# Patient Record
Sex: FEMALE | Race: WHITE | URBAN - METROPOLITAN AREA
[De-identification: names, ages, dates, MRNs, and addresses within clinical notes are randomized per-mention and may not be internally consistent; named-entity substitution may affect disease eponyms.]

---

## 2017-06-28 LAB
AVERAGE GLUCOSE: 189
HBA1C MFR BLD: 8.2 %
MAGNESIUM: 1.4 MG/DL

## 2017-09-27 LAB
ALBUMIN SERPL-MCNC: 3.9 G/DL
ALP BLD-CCNC: 80 U/L
ALT SERPL-CCNC: 16 U/L
ANION GAP SERPL CALCULATED.3IONS-SCNC: 10 MMOL/L
AST SERPL-CCNC: 11 U/L
BILIRUB SERPL-MCNC: 0.5 MG/DL (ref 0.1–1.4)
BUN BLDV-MCNC: 18 MG/DL
CALCIUM SERPL-MCNC: 9.3 MG/DL
CHLORIDE BLD-SCNC: 109 MMOL/L
CO2: 26 MMOL/L
CREAT SERPL-MCNC: 1.24 MG/DL
GFR CALCULATED: 41
GLUCOSE BLD-MCNC: 138 MG/DL
POTASSIUM SERPL-SCNC: 4 MMOL/L
SODIUM BLD-SCNC: 141 MMOL/L
TOTAL PROTEIN: 7.5

## 2017-12-28 LAB
AVERAGE GLUCOSE: 163
BASOPHILS ABSOLUTE: NORMAL /ΜL
BASOPHILS RELATIVE PERCENT: NORMAL %
EOSINOPHILS ABSOLUTE: NORMAL /ΜL
EOSINOPHILS RELATIVE PERCENT: NORMAL %
HBA1C MFR BLD: 7.3 %
HCT VFR BLD CALC: 42.3 % (ref 36–46)
HEMOGLOBIN: 14.9 G/DL (ref 12–16)
LYMPHOCYTES ABSOLUTE: NORMAL /ΜL
LYMPHOCYTES RELATIVE PERCENT: NORMAL %
MAGNESIUM: 1.6 MG/DL
MCH RBC QN AUTO: NORMAL PG
MCHC RBC AUTO-ENTMCNC: NORMAL G/DL
MCV RBC AUTO: NORMAL FL
MONOCYTES ABSOLUTE: NORMAL /ΜL
MONOCYTES RELATIVE PERCENT: NORMAL %
NEUTROPHILS ABSOLUTE: NORMAL /ΜL
NEUTROPHILS RELATIVE PERCENT: NORMAL %
PLATELET # BLD: 207 K/ΜL
PMV BLD AUTO: NORMAL FL
RBC # BLD: 4.53 10^6/ΜL
WBC # BLD: 6.1 10^3/ML

## 2018-04-04 LAB
ALBUMIN SERPL-MCNC: 3.7 G/DL
ALP BLD-CCNC: 83 U/L
ALT SERPL-CCNC: 15 U/L
ANION GAP SERPL CALCULATED.3IONS-SCNC: 10 MMOL/L
AST SERPL-CCNC: 13 U/L
AVERAGE GLUCOSE: 180
BASOPHILS ABSOLUTE: NORMAL /ΜL
BASOPHILS RELATIVE PERCENT: NORMAL %
BILIRUB SERPL-MCNC: 0.4 MG/DL (ref 0.1–1.4)
BUN BLDV-MCNC: 20 MG/DL
CALCIUM SERPL-MCNC: 8.9 MG/DL
CHLORIDE BLD-SCNC: 109 MMOL/L
CHOLESTEROL, TOTAL: 153 MG/DL
CHOLESTEROL/HDL RATIO: NORMAL
CO2: 26 MMOL/L
CREAT SERPL-MCNC: 1.22 MG/DL
EOSINOPHILS ABSOLUTE: NORMAL /ΜL
EOSINOPHILS RELATIVE PERCENT: NORMAL %
GFR CALCULATED: 42
GLUCOSE BLD-MCNC: 165 MG/DL
HBA1C MFR BLD: 7.9 %
HCT VFR BLD CALC: 36.1 % (ref 36–46)
HDLC SERPL-MCNC: 58 MG/DL (ref 35–70)
HEMOGLOBIN: 12.2 G/DL (ref 12–16)
LDL CHOLESTEROL CALCULATED: 58 MG/DL (ref 0–160)
LYMPHOCYTES ABSOLUTE: NORMAL /ΜL
LYMPHOCYTES RELATIVE PERCENT: NORMAL %
MCH RBC QN AUTO: NORMAL PG
MCHC RBC AUTO-ENTMCNC: NORMAL G/DL
MCV RBC AUTO: NORMAL FL
MONOCYTES ABSOLUTE: NORMAL /ΜL
MONOCYTES RELATIVE PERCENT: NORMAL %
NEUTROPHILS ABSOLUTE: NORMAL /ΜL
NEUTROPHILS RELATIVE PERCENT: NORMAL %
PLATELET # BLD: 176 K/ΜL
PMV BLD AUTO: NORMAL FL
POTASSIUM SERPL-SCNC: 4.2 MMOL/L
RBC # BLD: 3.8 10^6/ΜL
SODIUM BLD-SCNC: 141 MMOL/L
TOTAL PROTEIN: 7
TRIGL SERPL-MCNC: 187 MG/DL
VLDLC SERPL CALC-MCNC: 95 MG/DL
WBC # BLD: 4.66 10^3/ML

## 2018-09-27 LAB
BASOPHILS ABSOLUTE: NORMAL /ΜL
BASOPHILS RELATIVE PERCENT: NORMAL %
EOSINOPHILS ABSOLUTE: NORMAL /ΜL
EOSINOPHILS RELATIVE PERCENT: NORMAL %
HCT VFR BLD CALC: 38.5 % (ref 36–46)
HEMOGLOBIN: 13.5 G/DL (ref 12–16)
LYMPHOCYTES ABSOLUTE: NORMAL /ΜL
LYMPHOCYTES RELATIVE PERCENT: NORMAL %
MCH RBC QN AUTO: NORMAL PG
MCHC RBC AUTO-ENTMCNC: NORMAL G/DL
MCV RBC AUTO: NORMAL FL
MONOCYTES ABSOLUTE: NORMAL /ΜL
MONOCYTES RELATIVE PERCENT: NORMAL %
NEUTROPHILS ABSOLUTE: NORMAL /ΜL
NEUTROPHILS RELATIVE PERCENT: NORMAL %
PLATELET # BLD: 179 K/ΜL
PMV BLD AUTO: NORMAL FL
RBC # BLD: 4.19 10^6/ΜL
WBC # BLD: 4.27 10^3/ML

## 2018-10-09 LAB
ALBUMIN SERPL-MCNC: 3.7 G/DL
ALP BLD-CCNC: 73 U/L
ALT SERPL-CCNC: 15 U/L
ANION GAP SERPL CALCULATED.3IONS-SCNC: 10 MMOL/L
AST SERPL-CCNC: 13 U/L
AVERAGE GLUCOSE: 146
BASOPHILS ABSOLUTE: NORMAL /ΜL
BASOPHILS RELATIVE PERCENT: NORMAL %
BILIRUB SERPL-MCNC: 0.5 MG/DL (ref 0.1–1.4)
BUN BLDV-MCNC: 18 MG/DL
CALCIUM SERPL-MCNC: 8.9 MG/DL
CHLORIDE BLD-SCNC: 115 MMOL/L
CO2: 25 MMOL/L
CREAT SERPL-MCNC: 1.45 MG/DL
EOSINOPHILS ABSOLUTE: NORMAL /ΜL
EOSINOPHILS RELATIVE PERCENT: NORMAL %
GFR CALCULATED: 34
GLUCOSE BLD-MCNC: 135 MG/DL
HBA1C MFR BLD: 6.7 %
HCT VFR BLD CALC: 38.7 % (ref 36–46)
HEMOGLOBIN: 12.9 G/DL (ref 12–16)
LYMPHOCYTES ABSOLUTE: NORMAL /ΜL
LYMPHOCYTES RELATIVE PERCENT: NORMAL %
MCH RBC QN AUTO: NORMAL PG
MCHC RBC AUTO-ENTMCNC: NORMAL G/DL
MCV RBC AUTO: NORMAL FL
MONOCYTES ABSOLUTE: NORMAL /ΜL
MONOCYTES RELATIVE PERCENT: NORMAL %
NEUTROPHILS ABSOLUTE: NORMAL /ΜL
NEUTROPHILS RELATIVE PERCENT: NORMAL %
PLATELET # BLD: 199 K/ΜL
PMV BLD AUTO: NORMAL FL
POTASSIUM SERPL-SCNC: 4.9 MMOL/L
RBC # BLD: 4.07 10^6/ΜL
SODIUM BLD-SCNC: 145 MMOL/L
TOTAL PROTEIN: 6.9
WBC # BLD: 4.82 10^3/ML

## 2018-11-26 ENCOUNTER — OFFICE VISIT (OUTPATIENT)
Dept: NEPHROLOGY | Age: 83
End: 2018-11-26
Payer: MEDICARE

## 2018-11-26 VITALS
DIASTOLIC BLOOD PRESSURE: 77 MMHG | SYSTOLIC BLOOD PRESSURE: 162 MMHG | HEART RATE: 74 BPM | WEIGHT: 146.6 LBS | OXYGEN SATURATION: 98 %

## 2018-11-26 DIAGNOSIS — I10 ESSENTIAL HYPERTENSION: ICD-10-CM

## 2018-11-26 DIAGNOSIS — N18.30 CKD (CHRONIC KIDNEY DISEASE) STAGE 3, GFR 30-59 ML/MIN (HCC): ICD-10-CM

## 2018-11-26 DIAGNOSIS — R80.9 MICROALBUMINURIA: ICD-10-CM

## 2018-11-26 PROCEDURE — 1123F ACP DISCUSS/DSCN MKR DOCD: CPT | Performed by: INTERNAL MEDICINE

## 2018-11-26 PROCEDURE — 1090F PRES/ABSN URINE INCON ASSESS: CPT | Performed by: INTERNAL MEDICINE

## 2018-11-26 PROCEDURE — G8400 PT W/DXA NO RESULTS DOC: HCPCS | Performed by: INTERNAL MEDICINE

## 2018-11-26 PROCEDURE — 1036F TOBACCO NON-USER: CPT | Performed by: INTERNAL MEDICINE

## 2018-11-26 PROCEDURE — 4040F PNEUMOC VAC/ADMIN/RCVD: CPT | Performed by: INTERNAL MEDICINE

## 2018-11-26 PROCEDURE — G8598 ASA/ANTIPLAT THER USED: HCPCS | Performed by: INTERNAL MEDICINE

## 2018-11-26 PROCEDURE — G8421 BMI NOT CALCULATED: HCPCS | Performed by: INTERNAL MEDICINE

## 2018-11-26 PROCEDURE — G8484 FLU IMMUNIZE NO ADMIN: HCPCS | Performed by: INTERNAL MEDICINE

## 2018-11-26 PROCEDURE — G8427 DOCREV CUR MEDS BY ELIG CLIN: HCPCS | Performed by: INTERNAL MEDICINE

## 2018-11-26 PROCEDURE — 1101F PT FALLS ASSESS-DOCD LE1/YR: CPT | Performed by: INTERNAL MEDICINE

## 2018-11-26 PROCEDURE — 99204 OFFICE O/P NEW MOD 45 MIN: CPT | Performed by: INTERNAL MEDICINE

## 2018-11-26 RX ORDER — ATORVASTATIN CALCIUM 40 MG/1
40 TABLET, FILM COATED ORAL DAILY
COMMUNITY

## 2018-11-26 RX ORDER — LISINOPRIL 2.5 MG/1
2.5 TABLET ORAL 2 TIMES DAILY
COMMUNITY

## 2018-11-26 RX ORDER — CLOPIDOGREL BISULFATE 75 MG/1
75 TABLET ORAL DAILY
COMMUNITY

## 2018-11-26 RX ORDER — MAGNESIUM OXIDE 400 MG/1
400 TABLET ORAL 2 TIMES DAILY
COMMUNITY

## 2018-11-26 RX ORDER — MECLIZINE HCL 12.5 MG/1
TABLET ORAL PRN
COMMUNITY
Start: 2017-04-24

## 2018-11-26 ASSESSMENT — ENCOUNTER SYMPTOMS
NAUSEA: 0
CHEST TIGHTNESS: 0
SHORTNESS OF BREATH: 0
EYES NEGATIVE: 1
COUGH: 0
BACK PAIN: 0
VOMITING: 0

## 2018-11-26 NOTE — PROGRESS NOTES
narrative on file     Family History   Problem Relation Age of Onset    Diabetes Mother     Heart Attack Father     Heart Attack Sister     Heart Attack Brother     Heart Attack Brother     Heart Attack Brother     Diabetes Brother     Heart Disease Brother     Other Brother         Motorcycle accident at age 21    Diabetes Sister     Other Sister         Murdered in [facility] 58 Other Sister         Murdered in 2014    Diabetes Sister      Medications & Allergies :      Prior to Admission medications    Medication Sig Start Date End Date Taking? Authorizing Provider   metFORMIN (GLUCOPHAGE) 1000 MG tablet Take 1,000 mg by mouth 2 times daily   Yes Historical Provider, MD   clopidogrel (PLAVIX) 75 MG tablet Take 75 mg by mouth daily   Yes Historical Provider, MD   lisinopril (PRINIVIL;ZESTRIL) 2.5 MG tablet Take 2.5 mg by mouth 2 times daily   Yes Historical Provider, MD   metoprolol tartrate (LOPRESSOR) 25 MG tablet Take 12.5 mg by mouth 2 times daily   Yes Historical Provider, MD   atorvastatin (LIPITOR) 40 MG tablet Take 40 mg by mouth daily   Yes Historical Provider, MD   aspirin 81 MG tablet Take 81 mg by mouth daily   Yes Historical Provider, MD   meclizine (ANTIVERT) 12.5 MG tablet Take by mouth as needed 4/24/17  Yes Historical Provider, MD   magnesium oxide (MAG-OX) 400 MG tablet Take 400 mg by mouth 2 times daily   Yes Historical Provider, MD     Allergies: Sulfa antibiotics and Shellfish-derived products    Review of Systems Physical Exam   Review of Systems   Constitutional: Positive for fatigue. Negative for chills and fever. HENT: Positive for hearing loss. Eyes: Negative. Respiratory: Negative for cough, chest tightness and shortness of breath. Cardiovascular: Negative for chest pain and leg swelling. Gastrointestinal: Negative for nausea and vomiting. Genitourinary: Negative for flank pain, frequency and hematuria.    Musculoskeletal: Negative for back pain, joint swelling and regurgitation    Assessment    1. Renal - patient seems to be having chronic kidney disease stage III most likely secondary to senile nephrosclerosis long-standing hypertension/diabetes  - Cannot rule out mild acute kidney injury at this time. This may be even progressive CKD  - At this time we will repeat her blood work check urine for protein, urinalysis and also obtain a renal ultrasound scan. 2. Essential hypertension running high, patient is having life stressors at this time advised home blood pressure monitoring  3. Electrolytes-appear to be within normal limits  4. Microalbuminuria we'll quantify mostly from diabetes on low-dose lisinopril  5. History of systolic congestive heart failure with ejection fraction 25% in 3/2015  6. Diabetes mellitus with last A1c of 6.7 in October 2018  7. FU in 6 weeks  Plan     Orders Placed This Encounter   Procedures    US Renal Kidney    Basic Metabolic Panel    Urinalysis with Microscopic    Microalbumin / Creatinine Urine Ratio    Protein / creatinine ratio, urine    Uric Acid       Pinky Tobin M.D  [facility].

## 2019-01-08 LAB — URIC ACID: 7.5

## 2019-01-09 ENCOUNTER — TELEPHONE (OUTPATIENT)
Dept: NEPHROLOGY | Age: 84
End: 2019-01-09

## 2019-01-09 DIAGNOSIS — R80.9 MICROALBUMINURIA: ICD-10-CM

## 2019-01-09 DIAGNOSIS — I10 ESSENTIAL HYPERTENSION: ICD-10-CM

## 2019-01-09 DIAGNOSIS — N18.30 CKD (CHRONIC KIDNEY DISEASE) STAGE 3, GFR 30-59 ML/MIN (HCC): ICD-10-CM

## 2019-01-14 ENCOUNTER — OFFICE VISIT (OUTPATIENT)
Dept: NEPHROLOGY | Age: 84
End: 2019-01-14
Payer: MEDICARE

## 2019-01-14 VITALS
WEIGHT: 143.5 LBS | DIASTOLIC BLOOD PRESSURE: 86 MMHG | SYSTOLIC BLOOD PRESSURE: 170 MMHG | HEART RATE: 80 BPM | OXYGEN SATURATION: 97 %

## 2019-01-14 DIAGNOSIS — E79.0 HYPERURICEMIA: ICD-10-CM

## 2019-01-14 DIAGNOSIS — N18.30 CKD (CHRONIC KIDNEY DISEASE) STAGE 3, GFR 30-59 ML/MIN (HCC): Primary | ICD-10-CM

## 2019-01-14 DIAGNOSIS — I10 ESSENTIAL HYPERTENSION: ICD-10-CM

## 2019-01-14 LAB
BUN BLDV-MCNC: 20 MG/DL
CALCIUM SERPL-MCNC: 9 MG/DL
CHLORIDE BLD-SCNC: 112 MMOL/L
CO2: 22 MMOL/L
CREAT SERPL-MCNC: 1.3 MG/DL
GFR CALCULATED: 37
GLUCOSE BLD-MCNC: 114 MG/DL
POTASSIUM SERPL-SCNC: 4.9 MMOL/L
SODIUM BLD-SCNC: 144 MMOL/L

## 2019-01-14 PROCEDURE — G8484 FLU IMMUNIZE NO ADMIN: HCPCS | Performed by: INTERNAL MEDICINE

## 2019-01-14 PROCEDURE — 1123F ACP DISCUSS/DSCN MKR DOCD: CPT | Performed by: INTERNAL MEDICINE

## 2019-01-14 PROCEDURE — G8427 DOCREV CUR MEDS BY ELIG CLIN: HCPCS | Performed by: INTERNAL MEDICINE

## 2019-01-14 PROCEDURE — 1036F TOBACCO NON-USER: CPT | Performed by: INTERNAL MEDICINE

## 2019-01-14 PROCEDURE — G8421 BMI NOT CALCULATED: HCPCS | Performed by: INTERNAL MEDICINE

## 2019-01-14 PROCEDURE — 4040F PNEUMOC VAC/ADMIN/RCVD: CPT | Performed by: INTERNAL MEDICINE

## 2019-01-14 PROCEDURE — 99213 OFFICE O/P EST LOW 20 MIN: CPT | Performed by: INTERNAL MEDICINE

## 2019-01-14 PROCEDURE — 1101F PT FALLS ASSESS-DOCD LE1/YR: CPT | Performed by: INTERNAL MEDICINE

## 2019-01-14 PROCEDURE — 1090F PRES/ABSN URINE INCON ASSESS: CPT | Performed by: INTERNAL MEDICINE

## 2019-01-14 PROCEDURE — G8400 PT W/DXA NO RESULTS DOC: HCPCS | Performed by: INTERNAL MEDICINE

## 2019-01-14 PROCEDURE — G8598 ASA/ANTIPLAT THER USED: HCPCS | Performed by: INTERNAL MEDICINE

## 2019-01-15 LAB
BILIRUBIN, URINE: NEGATIVE
BLOOD, URINE: POSITIVE
CLARITY: ABNORMAL
COLOR: ABNORMAL
CREATININE, URINE: 175
GLUCOSE URINE: ABNORMAL
KETONES, URINE: NEGATIVE
LEUKOCYTE ESTERASE, URINE: POSITIVE
MICROALBUMIN/CREAT 24H UR: 3.4 MG/G{CREAT}
MICROALBUMIN/CREAT UR-RTO: 19
NITRITE, URINE: POSITIVE
PH UA: 5 (ref 4.5–8)
PROTEIN UA: NEGATIVE
SPECIFIC GRAVITY, URINE: 1.02
UROBILINOGEN, URINE: NORMAL

## 2019-07-08 ENCOUNTER — OFFICE VISIT (OUTPATIENT)
Dept: NEPHROLOGY | Age: 84
End: 2019-07-08
Payer: MEDICARE

## 2019-07-08 VITALS
WEIGHT: 145.1 LBS | DIASTOLIC BLOOD PRESSURE: 70 MMHG | OXYGEN SATURATION: 97 % | SYSTOLIC BLOOD PRESSURE: 120 MMHG | HEART RATE: 70 BPM

## 2019-07-08 DIAGNOSIS — R80.9 MICROALBUMINURIA: ICD-10-CM

## 2019-07-08 DIAGNOSIS — I10 ESSENTIAL HYPERTENSION: ICD-10-CM

## 2019-07-08 DIAGNOSIS — E79.0 HYPERURICEMIA: ICD-10-CM

## 2019-07-08 DIAGNOSIS — N18.30 CKD (CHRONIC KIDNEY DISEASE) STAGE 3, GFR 30-59 ML/MIN (HCC): Primary | ICD-10-CM

## 2019-07-08 PROCEDURE — 1036F TOBACCO NON-USER: CPT | Performed by: INTERNAL MEDICINE

## 2019-07-08 PROCEDURE — 1090F PRES/ABSN URINE INCON ASSESS: CPT | Performed by: INTERNAL MEDICINE

## 2019-07-08 PROCEDURE — 4040F PNEUMOC VAC/ADMIN/RCVD: CPT | Performed by: INTERNAL MEDICINE

## 2019-07-08 PROCEDURE — G8421 BMI NOT CALCULATED: HCPCS | Performed by: INTERNAL MEDICINE

## 2019-07-08 PROCEDURE — G8598 ASA/ANTIPLAT THER USED: HCPCS | Performed by: INTERNAL MEDICINE

## 2019-07-08 PROCEDURE — G8427 DOCREV CUR MEDS BY ELIG CLIN: HCPCS | Performed by: INTERNAL MEDICINE

## 2019-07-08 PROCEDURE — G8400 PT W/DXA NO RESULTS DOC: HCPCS | Performed by: INTERNAL MEDICINE

## 2019-07-08 PROCEDURE — 1123F ACP DISCUSS/DSCN MKR DOCD: CPT | Performed by: INTERNAL MEDICINE

## 2019-07-08 PROCEDURE — 99213 OFFICE O/P EST LOW 20 MIN: CPT | Performed by: INTERNAL MEDICINE

## 2019-08-05 LAB
BUN BLDV-MCNC: 22 MG/DL
CALCIUM SERPL-MCNC: 9.1 MG/DL
CHLORIDE BLD-SCNC: 110 MMOL/L
CO2: 26 MMOL/L
CREAT SERPL-MCNC: 1.33 MG/DL
GFR CALCULATED: 37
GLUCOSE BLD-MCNC: 144 MG/DL
POTASSIUM SERPL-SCNC: 3.9 MMOL/L
SODIUM BLD-SCNC: 142 MMOL/L

## 2020-07-13 ENCOUNTER — OFFICE VISIT (OUTPATIENT)
Dept: NEPHROLOGY | Age: 85
End: 2020-07-13
Payer: MEDICARE

## 2020-07-13 VITALS
DIASTOLIC BLOOD PRESSURE: 76 MMHG | WEIGHT: 143 LBS | OXYGEN SATURATION: 97 % | SYSTOLIC BLOOD PRESSURE: 128 MMHG | HEART RATE: 66 BPM

## 2020-07-13 PROCEDURE — 4040F PNEUMOC VAC/ADMIN/RCVD: CPT | Performed by: INTERNAL MEDICINE

## 2020-07-13 PROCEDURE — G8400 PT W/DXA NO RESULTS DOC: HCPCS | Performed by: INTERNAL MEDICINE

## 2020-07-13 PROCEDURE — 1036F TOBACCO NON-USER: CPT | Performed by: INTERNAL MEDICINE

## 2020-07-13 PROCEDURE — 99213 OFFICE O/P EST LOW 20 MIN: CPT | Performed by: INTERNAL MEDICINE

## 2020-07-13 PROCEDURE — 1090F PRES/ABSN URINE INCON ASSESS: CPT | Performed by: INTERNAL MEDICINE

## 2020-07-13 PROCEDURE — G8421 BMI NOT CALCULATED: HCPCS | Performed by: INTERNAL MEDICINE

## 2020-07-13 PROCEDURE — 1123F ACP DISCUSS/DSCN MKR DOCD: CPT | Performed by: INTERNAL MEDICINE

## 2020-07-13 PROCEDURE — G8427 DOCREV CUR MEDS BY ELIG CLIN: HCPCS | Performed by: INTERNAL MEDICINE

## 2020-07-13 NOTE — PROGRESS NOTES
[facility]        Outpatient Follow-Up note         7/13/2020 1:32 PM    Patient Name:   Jacey Paiz  YOB: 1935  Primary Care Physician:  Annetta Avila     Chief Complaint / Reason for follow-up : Follow Up of CKD     Interval History :  Patient seen and examined by me. Feels well. No chest pain or SOB. No acute distress. No hospitalizations .  No med changes     Past History :  Past Medical History:   Diagnosis Date    [facility] [address] [address]     Heart attack ([facility] [address] 03/2015    Hypertension     Vertigo      Past Surgical History:   Procedure Laterality Date    BREAST SURGERY Right 01/1955    CHOLECYSTECTOMY      1980    JOINT REPLACEMENT  03/2015        Medications :     Outpatient Medications Marked as Taking for the 7/13/20 encounter (Office Visit) with Marian Daniel MD   Medication Sig Dispense Refill    metFORMIN (GLUCOPHAGE) 1000 MG tablet Take 1,000 mg by mouth 2 times daily      clopidogrel (PLAVIX) 75 MG tablet Take 75 mg by mouth daily      lisinopril (PRINIVIL;ZESTRIL) 2.5 MG tablet Take 2.5 mg by mouth 2 times daily      metoprolol tartrate (LOPRESSOR) 25 MG tablet Take 12.5 mg by mouth 2 times daily      atorvastatin (LIPITOR) 40 MG tablet Take 40 mg by mouth daily      aspirin 81 MG tablet Take 81 mg by mouth daily      meclizine (ANTIVERT) 12.5 MG tablet Take by mouth as needed      magnesium oxide (MAG-OX) 400 MG tablet Take 400 mg by mouth 2 times daily         Vitals     /76 (Site: Left Upper Arm, Position: Sitting, Cuff Size: Medium Adult)   Pulse 66   Wt 143 lb (64.9 kg) Comment: per patient within last month  SpO2 97%  Wt Readings from Last 3 Encounters:   07/13/20 143 lb (64.9 kg)   07/08/19 145 lb 1.6 oz (65.8 kg)   01/14/19 143 lb 8 oz (65.1 kg)        Physical Exam     General -- no distress  Lungs -- clear  Heart -- S1, S2 heard, JVD - no  Abdomen - soft, non-tender  Extremities -- no edema  CNS - awake and alert    Labs, Radiology and Tests    Labs -     11/18 4/19 5/20                Potassium 4.9  3.8 3.9                BUN 18  18 24                Creatinine 1.45  1.28  1.86               eGFR 34  39  28                                     UPCR 30 100                  UMCR    19 9                                        1/19 - uric acid - 7.5    Renal Ultrasound Scan -- 1/2019  Rt kidney 10 cm/ Left kidney 10 cm  Normal study      Other : old  lab data and PCP notes have been reviewed and noted patient's baseline creatinine is normally around 1.2 in 2017     Echocardiogram 3/2015 shows ejection fraction 20-25% and mild mitral regurgitation     Assessment    1. Renal - patient seems to be having chronic kidney disease stage III most likely secondary to senile nephrosclerosis long-standing hypertension/diabetes  -Patient's creatinine is slightly worse has been getting worse within the last 6 months.  -Not exactly clear whether this is some acute kidney injury or progressive chronic kidney disease  -Patient is drinking a lot of liquids. Will discontinue the lisinopril and repeat blood work on Thursday  2. Essential hypertension running well. Continue current medications. Discontinue lisinopril  3. Electrolytes-appear to be within normal limits  4. Microalbuminuria-minimal hold lisinopril for now  5. History of systolic congestive heart failure with ejection fraction 25% in 3/2015 . Not on diuretics  6. Diabetes mellitus with last A1c of 7.3 in April 2019 she is on metformin. Prefer slightly lower dose but okay to continue for now  7. Hyperuricemia-repeat in the next visit  8. Hypomagnesemia -on magnesium supplements. Will check a magnesium level today unclear etiology  9. FU in 3 months      Tests and orders placed this Encounter     Orders Placed This Encounter   Procedures    Basic Metabolic Panel    Basic Metabolic Panel    CBC       Sue Cates M.D  [facility].

## 2020-07-16 LAB
BUN BLDV-MCNC: 14 MG/DL
CALCIUM SERPL-MCNC: 9 MG/DL
CHLORIDE BLD-SCNC: 108 MMOL/L
CO2: 26 MMOL/L
CREAT SERPL-MCNC: 1.22 MG/DL
GFR CALCULATED: 41
GLUCOSE BLD-MCNC: 159 MG/DL
POTASSIUM SERPL-SCNC: 3.9 MMOL/L
SODIUM BLD-SCNC: 140 MMOL/L

## 2020-10-12 ENCOUNTER — OFFICE VISIT (OUTPATIENT)
Dept: NEPHROLOGY | Age: 85
End: 2020-10-12
Payer: MEDICARE

## 2020-10-12 VITALS
WEIGHT: 141 LBS | DIASTOLIC BLOOD PRESSURE: 70 MMHG | HEART RATE: 65 BPM | SYSTOLIC BLOOD PRESSURE: 126 MMHG | OXYGEN SATURATION: 97 %

## 2020-10-12 PROCEDURE — G8400 PT W/DXA NO RESULTS DOC: HCPCS | Performed by: INTERNAL MEDICINE

## 2020-10-12 PROCEDURE — G8421 BMI NOT CALCULATED: HCPCS | Performed by: INTERNAL MEDICINE

## 2020-10-12 PROCEDURE — G8484 FLU IMMUNIZE NO ADMIN: HCPCS | Performed by: INTERNAL MEDICINE

## 2020-10-12 PROCEDURE — G8427 DOCREV CUR MEDS BY ELIG CLIN: HCPCS | Performed by: INTERNAL MEDICINE

## 2020-10-12 PROCEDURE — 99213 OFFICE O/P EST LOW 20 MIN: CPT | Performed by: INTERNAL MEDICINE

## 2020-10-12 PROCEDURE — 1090F PRES/ABSN URINE INCON ASSESS: CPT | Performed by: INTERNAL MEDICINE

## 2020-10-12 PROCEDURE — 4040F PNEUMOC VAC/ADMIN/RCVD: CPT | Performed by: INTERNAL MEDICINE

## 2020-10-12 PROCEDURE — 1123F ACP DISCUSS/DSCN MKR DOCD: CPT | Performed by: INTERNAL MEDICINE

## 2020-10-12 PROCEDURE — 1036F TOBACCO NON-USER: CPT | Performed by: INTERNAL MEDICINE

## 2020-10-12 NOTE — PROGRESS NOTES
Has a lot of leakage at night time     No current pharmacy.  Waiting to see which pharmacy her insurance will except

## 2020-10-12 NOTE — PROGRESS NOTES
[facility]        Outpatient Follow-Up note         10/12/2020 1:46 PM    Patient Name:   Edis Barrientos  YOB: 1935  Primary Care Physician:  Kylee Miguel     Chief Complaint / Reason for follow-up : Follow Up of CKD     Interval History :  Patient seen and examined by me. Feels well. No chest pain or SOB. No acute distress. No hospitalizations .  No med changes since last visit     Past History :  Past Medical History:   Diagnosis Date    Diabetes ([facility] [address]     Heart attack ([facility] [address] 03/2015    Hypertension     Vertigo      Past Surgical History:   Procedure Laterality Date    BREAST SURGERY Right 01/1955    CHOLECYSTECTOMY      1980    JOINT REPLACEMENT  03/2015        Medications :     Outpatient Medications Marked as Taking for the 10/12/20 encounter (Office Visit) with Dalila Burnham MD   Medication Sig Dispense Refill    metFORMIN (GLUCOPHAGE) 1000 MG tablet Take 1,000 mg by mouth 2 times daily      clopidogrel (PLAVIX) 75 MG tablet Take 75 mg by mouth daily      lisinopril (PRINIVIL;ZESTRIL) 2.5 MG tablet Take 2.5 mg by mouth 2 times daily      metoprolol tartrate (LOPRESSOR) 25 MG tablet Take 12.5 mg by mouth 2 times daily      atorvastatin (LIPITOR) 40 MG tablet Take 40 mg by mouth daily      aspirin 81 MG tablet Take 81 mg by mouth daily      meclizine (ANTIVERT) 12.5 MG tablet Take by mouth as needed      magnesium oxide (MAG-OX) 400 MG tablet Take 400 mg by mouth 2 times daily         Vitals     /70 (Site: Left Upper Arm, Position: Sitting, Cuff Size: Medium Adult)   Pulse 65   Wt 141 lb (64 kg)   SpO2 97%  Wt Readings from Last 3 Encounters:   10/12/20 141 lb (64 kg)   07/13/20 143 lb (64.9 kg)   07/08/19 145 lb 1.6 oz (65.8 kg)        Physical Exam     General -- no distress  Lungs -- clear  Heart -- S1, S2 heard, JVD - no  Abdomen - soft, non-tender  Extremities -- no edema  CNS - awake and alert    Labs, Radiology and Tests Labs -     11/18 4/19  5/20  10/20              Potassium 4.9  3.8 3.9   4.0             BUN 18  18 24   19             Creatinine 1.45  1.28  1.86  1.36             eGFR 34  39  28  36                                   UPCR 30 100                  UMCR    19 9                                          Renal Ultrasound Scan -- 1/2019  Rt kidney 10 cm/ Left kidney 10 cm  Normal study      Echocardiogram 3/2015 shows ejection fraction 20-25% and mild mitral regurgitation     Assessment    1. Renal - patient seems to be having chronic kidney disease stage III most likely secondary to senile nephrosclerosis long-standing hypertension/diabetes  -Patient's creatinine has actually worsened  - overall well. 2. Essential hypertension running well. Continue current medications. Discontinue lisinopril  3. Electrolytes-appear to be within normal limits  4. Microalbuminuria-minimal on lisinopril  5. History of systolic congestive heart failure with ejection fraction 25% in 3/2015 . Not on diuretics  6. Diabetes mellitus with last A1c of 7.4 in October 2020 she is on metformin. Prefer slightly lower dose but okay to continue for now  7. Hyperuricemia-repeat in the next visit  8. Hypomagnesemia -on magnesium supplements. Will check a magnesium level today unclear etiology  9. FU in 3 months      Tests and orders placed this Encounter     Orders Placed This Encounter   Procedures    Basic Metabolic Panel    Creatinine, Random Urine    MICROALBUMIN, RANDOM URINE (W/O CREATININE)    Protein, urine, random       Lazara Lennon M.D  [facility].

## 2021-04-08 LAB
BUN BLDV-MCNC: 27 MG/DL
CALCIUM SERPL-MCNC: 9.4 MG/DL
CHLORIDE BLD-SCNC: 109 MMOL/L
CO2: 25 MMOL/L
CREAT SERPL-MCNC: 1.43 MG/DL
CREATININE, URINE: 99.1
GFR CALCULATED: 34
GLUCOSE BLD-MCNC: 184 MG/DL
MICROALBUMIN/CREAT 24H UR: 0.2 MG/G{CREAT}
MICROALBUMIN/CREAT UR-RTO: 2
POTASSIUM SERPL-SCNC: 4.2 MMOL/L
PROTEIN, URINE, RANDOM: 10.6
SODIUM BLD-SCNC: 140 MMOL/L

## 2021-04-12 ENCOUNTER — OFFICE VISIT (OUTPATIENT)
Dept: NEPHROLOGY | Age: 86
End: 2021-04-12
Payer: MEDICARE

## 2021-04-12 VITALS
WEIGHT: 144 LBS | OXYGEN SATURATION: 98 % | SYSTOLIC BLOOD PRESSURE: 120 MMHG | HEART RATE: 63 BPM | DIASTOLIC BLOOD PRESSURE: 68 MMHG

## 2021-04-12 DIAGNOSIS — N18.32 STAGE 3B CHRONIC KIDNEY DISEASE (HCC): Primary | ICD-10-CM

## 2021-04-12 DIAGNOSIS — I10 ESSENTIAL HYPERTENSION: ICD-10-CM

## 2021-04-12 PROCEDURE — 1090F PRES/ABSN URINE INCON ASSESS: CPT | Performed by: INTERNAL MEDICINE

## 2021-04-12 PROCEDURE — G8427 DOCREV CUR MEDS BY ELIG CLIN: HCPCS | Performed by: INTERNAL MEDICINE

## 2021-04-12 PROCEDURE — 99213 OFFICE O/P EST LOW 20 MIN: CPT | Performed by: INTERNAL MEDICINE

## 2021-04-12 PROCEDURE — 1036F TOBACCO NON-USER: CPT | Performed by: INTERNAL MEDICINE

## 2021-04-12 PROCEDURE — G8400 PT W/DXA NO RESULTS DOC: HCPCS | Performed by: INTERNAL MEDICINE

## 2021-04-12 PROCEDURE — G8421 BMI NOT CALCULATED: HCPCS | Performed by: INTERNAL MEDICINE

## 2021-04-12 PROCEDURE — 1123F ACP DISCUSS/DSCN MKR DOCD: CPT | Performed by: INTERNAL MEDICINE

## 2021-04-12 PROCEDURE — 4040F PNEUMOC VAC/ADMIN/RCVD: CPT | Performed by: INTERNAL MEDICINE

## 2021-04-12 NOTE — PROGRESS NOTES
11/18 4/19  5/20  10/20   4/21           Potassium 4.9  3.8 3.9   4.0  4.2           BUN 18 18 24   19  27           Creatinine 1.45  1.28  1.86  1.36  1.43           eGFR 34  39  28  36  34                                 UPCR 30 100       100           UMCR    19 9     2                                     Renal Ultrasound Scan -- 1/2019  Rt kidney 10 cm/ Left kidney 10 cm  Normal study      Echocardiogram 3/2015 shows ejection fraction 20-25% and mild mitral regurgitation     Assessment    1. Renal - patient seems to be having chronic kidney disease stage III most likely secondary to senile nephrosclerosis long-standing hypertension/diabetes  -Patient's creatinine has been stable  2. Essential hypertension running well. Continue current medications. 3. Electrolytes-appear to be within normal limits  4. Microalbuminuria-minimal on lisinopril  5. History of systolic congestive heart failure with ejection fraction 25% in 3/2015 . Not on diuretics  6. Diabetes mellitus with last A1c of 7.4 in October 2020 she is on metformin. Prefer slightly lower dose but okay to continue for now  7. FU in 6 months      Tests and orders placed this Encounter     Orders Placed This Encounter   Procedures   Makeda Alfredo M.D  [facility].

## 2021-10-11 ENCOUNTER — OFFICE VISIT (OUTPATIENT)
Dept: NEPHROLOGY | Age: 86
End: 2021-10-11
Payer: MEDICARE

## 2021-10-11 VITALS — SYSTOLIC BLOOD PRESSURE: 122 MMHG | HEART RATE: 78 BPM | DIASTOLIC BLOOD PRESSURE: 64 MMHG | WEIGHT: 137 LBS

## 2021-10-11 DIAGNOSIS — I10 ESSENTIAL HYPERTENSION: ICD-10-CM

## 2021-10-11 DIAGNOSIS — N18.32 STAGE 3B CHRONIC KIDNEY DISEASE (HCC): Primary | ICD-10-CM

## 2021-10-11 PROCEDURE — G8484 FLU IMMUNIZE NO ADMIN: HCPCS | Performed by: INTERNAL MEDICINE

## 2021-10-11 PROCEDURE — 1090F PRES/ABSN URINE INCON ASSESS: CPT | Performed by: INTERNAL MEDICINE

## 2021-10-11 PROCEDURE — 1123F ACP DISCUSS/DSCN MKR DOCD: CPT | Performed by: INTERNAL MEDICINE

## 2021-10-11 PROCEDURE — G8400 PT W/DXA NO RESULTS DOC: HCPCS | Performed by: INTERNAL MEDICINE

## 2021-10-11 PROCEDURE — 1036F TOBACCO NON-USER: CPT | Performed by: INTERNAL MEDICINE

## 2021-10-11 PROCEDURE — G8427 DOCREV CUR MEDS BY ELIG CLIN: HCPCS | Performed by: INTERNAL MEDICINE

## 2021-10-11 PROCEDURE — G8421 BMI NOT CALCULATED: HCPCS | Performed by: INTERNAL MEDICINE

## 2021-10-11 PROCEDURE — 99213 OFFICE O/P EST LOW 20 MIN: CPT | Performed by: INTERNAL MEDICINE

## 2021-10-11 PROCEDURE — 4040F PNEUMOC VAC/ADMIN/RCVD: CPT | Performed by: INTERNAL MEDICINE

## 2021-10-11 RX ORDER — TIMOLOL MALEATE 5 MG/ML
SOLUTION/ DROPS OPHTHALMIC
COMMUNITY
Start: 2021-08-12 | End: 2022-04-11 | Stop reason: ALTCHOICE

## 2021-10-11 RX ORDER — BRIMONIDINE TARTRATE 2 MG/ML
SOLUTION/ DROPS OPHTHALMIC
COMMUNITY
Start: 2021-08-12 | End: 2022-04-11 | Stop reason: ALTCHOICE

## 2021-10-11 NOTE — PROGRESS NOTES
[facility]        Outpatient Follow-Up note         10/11/2021 2:04 PM    Patient Name:   Simon Oliva:    1935  Primary Care Physician:  Claudean Gasser     Chief Complaint / Reason for follow-up : Follow Up of CKD     Interval History :  Patient seen and examined by me. Feels well. No chest pain or SOB. No acute distress.   No hospitalizations or med changes since last visit     Past History :  Past Medical History:   Diagnosis Date    Diabetes ([facility] [address]     Heart attack ([place] [address] 03/2015    Hypertension     Vertigo      Past Surgical History:   Procedure Laterality Date    BREAST SURGERY Right 01/1955    CHOLECYSTECTOMY      1980    JOINT REPLACEMENT  03/2015        Medications :     Outpatient Medications Marked as Taking for the 10/11/21 encounter (Office Visit) with Heidi Tsai MD   Medication Sig Dispense Refill    brimonidine (ALPHAGAN) 0.2 % ophthalmic solution INSTILL ONE DROP INTO RIGHT EYE TWO TIMES DAILY UNTIL FURTHER NOTICE      timolol (TIMOPTIC) 0.5 % ophthalmic solution USE 1 DROP INTO BOTH EYES EVERY MORNING      metFORMIN (GLUCOPHAGE) 1000 MG tablet Take 1,000 mg by mouth 2 times daily      clopidogrel (PLAVIX) 75 MG tablet Take 75 mg by mouth daily      lisinopril (PRINIVIL;ZESTRIL) 2.5 MG tablet Take 2.5 mg by mouth 2 times daily      metoprolol tartrate (LOPRESSOR) 25 MG tablet Take 12.5 mg by mouth 2 times daily      atorvastatin (LIPITOR) 40 MG tablet Take 40 mg by mouth daily      aspirin 81 MG tablet Take 81 mg by mouth daily      meclizine (ANTIVERT) 12.5 MG tablet Take by mouth as needed      magnesium oxide (MAG-OX) 400 MG tablet Take 400 mg by mouth 2 times daily         Vitals     /64 (Site: Left Upper Arm, Position: Sitting, Cuff Size: Medium Adult)   Pulse 78   Wt 137 lb (62.1 kg)  Wt Readings from Last 3 Encounters:   10/11/21 137 lb (62.1 kg)   04/12/21 144 lb (65.3 kg)   10/12/20 141 lb (64 kg) Physical Exam     General -- no distress  Lungs -- clear  Heart -- S1, S2 heard, JVD - no  Abdomen - soft, non-tender  Extremities -- no edema  CNS - awake and alert    Labs, Radiology and Tests    Labs -     11/18 4/19  5/20  10/20   4/21  8/21         Potassium 4.9  3.8 3.9   4.0  4.2  4.4         BUN 18  18 24   19  27  19         Creatinine 1.45  1.28  1.86  1.36  1.43  1.28         eGFR 34  39  28  36  34  38                               UPCR 30 100       100           UMCR    19 9     2                                     Renal Ultrasound Scan -- 1/2019  Rt kidney 10 cm/ Left kidney 10 cm  Normal study      Echocardiogram 3/2015 shows ejection fraction 20-25% and mild mitral regurgitation     Assessment    1. Renal - patient seems to be having chronic kidney disease stage III most likely secondary to senile nephrosclerosis long-standing hypertension/diabetes  -Patient's creatinine has been stable . 2. Essential hypertension running well. Continue current medications. 3. Electrolytes-appear to be within normal limits  4. Microalbuminuria-minimal on lisinopril  5. History of systolic congestive heart failure with ejection fraction 25% in 3/2015 . Not on diuretics  6. Diabetes mellitus with last A1c of 7.0 in August 2021 she is on metformin. Prefer slightly lower dose but okay to continue for now  7. FU in 6 months      Tests and orders placed this Encounter     Orders Placed This Encounter   Procedures    Basic Metabolic Panel    Creatinine, Random Urine    MICROALBUMIN, RANDOM URINE (W/O CREATININE)    Protein, urine, random       Ashia Altman M.D  [facility].

## 2021-10-11 NOTE — PROGRESS NOTES
Last labs were in august. Ruchi Prows stated they were no current orders. She couldn't have her labs drawn until November 11th.

## 2022-04-04 LAB
BUN BLDV-MCNC: 12 MG/DL
CALCIUM SERPL-MCNC: 8.7 MG/DL
CHLORIDE BLD-SCNC: 112 MMOL/L
CO2: 27 MMOL/L
CREAT SERPL-MCNC: 1.14 MG/DL
GFR CALCULATED: 44
GLUCOSE BLD-MCNC: 141 MG/DL
POTASSIUM SERPL-SCNC: 3.9 MMOL/L
SODIUM BLD-SCNC: 142 MMOL/L

## 2022-04-11 ENCOUNTER — OFFICE VISIT (OUTPATIENT)
Dept: NEPHROLOGY | Age: 87
End: 2022-04-11
Payer: MEDICARE

## 2022-04-11 VITALS
WEIGHT: 140 LBS | SYSTOLIC BLOOD PRESSURE: 158 MMHG | HEIGHT: 63 IN | DIASTOLIC BLOOD PRESSURE: 86 MMHG | BODY MASS INDEX: 24.8 KG/M2

## 2022-04-11 DIAGNOSIS — N18.32 STAGE 3B CHRONIC KIDNEY DISEASE (HCC): Primary | ICD-10-CM

## 2022-04-11 DIAGNOSIS — I10 ESSENTIAL HYPERTENSION: ICD-10-CM

## 2022-04-11 PROCEDURE — 1090F PRES/ABSN URINE INCON ASSESS: CPT | Performed by: INTERNAL MEDICINE

## 2022-04-11 PROCEDURE — 99213 OFFICE O/P EST LOW 20 MIN: CPT | Performed by: INTERNAL MEDICINE

## 2022-04-11 PROCEDURE — 4040F PNEUMOC VAC/ADMIN/RCVD: CPT | Performed by: INTERNAL MEDICINE

## 2022-04-11 PROCEDURE — 1123F ACP DISCUSS/DSCN MKR DOCD: CPT | Performed by: INTERNAL MEDICINE

## 2022-04-11 PROCEDURE — G8420 CALC BMI NORM PARAMETERS: HCPCS | Performed by: INTERNAL MEDICINE

## 2022-04-11 PROCEDURE — G8400 PT W/DXA NO RESULTS DOC: HCPCS | Performed by: INTERNAL MEDICINE

## 2022-04-11 PROCEDURE — 1036F TOBACCO NON-USER: CPT | Performed by: INTERNAL MEDICINE

## 2022-04-11 PROCEDURE — G8427 DOCREV CUR MEDS BY ELIG CLIN: HCPCS | Performed by: INTERNAL MEDICINE

## 2022-04-11 NOTE — PROGRESS NOTES
[facility]        Outpatient Follow-Up note         4/11/2022 1:36 PM    Patient Name:   Luis Farrar  YOB: 1935  Primary Care Physician:  Michell Santana     Chief Complaint / Reason for follow-up : Follow Up of CKD     Interval History :  Patient seen and examined by me. Feels well. No chest pain or SOB. No acute distress. No hospitalizations or med changes.  Had eye surgery done     Past History :  Past Medical History:   Diagnosis Date    Diabetes ([facility] [address]     Heart attack ([facility] [address] 03/2015    Hypertension     Vertigo      Past Surgical History:   Procedure Laterality Date    BREAST SURGERY Right 01/1955    CHOLECYSTECTOMY      1980    JOINT REPLACEMENT  03/2015        Medications :     Outpatient Medications Marked as Taking for the 4/11/22 encounter (Office Visit) with Germaine Lizarraga MD   Medication Sig Dispense Refill    metFORMIN (GLUCOPHAGE) 1000 MG tablet Take 1,000 mg by mouth 2 times daily      clopidogrel (PLAVIX) 75 MG tablet Take 75 mg by mouth daily      lisinopril (PRINIVIL;ZESTRIL) 2.5 MG tablet Take 2.5 mg by mouth 2 times daily      metoprolol tartrate (LOPRESSOR) 25 MG tablet Take 12.5 mg by mouth 2 times daily      atorvastatin (LIPITOR) 40 MG tablet Take 40 mg by mouth daily      aspirin 81 MG tablet Take 81 mg by mouth daily      meclizine (ANTIVERT) 12.5 MG tablet Take by mouth as needed      magnesium oxide (MAG-OX) 400 MG tablet Take 400 mg by mouth 2 times daily         Vitals     BP (!) 158/86 (Site: Left Upper Arm, Position: Sitting, Cuff Size: Small Adult)   Ht 5' 3\" (1.6 m)   Wt 140 lb (63.5 kg)   BMI 24.80 kg/m²  Wt Readings from Last 3 Encounters:   04/11/22 140 lb (63.5 kg)   10/11/21 137 lb (62.1 kg)   04/12/21 144 lb (65.3 kg)        Physical Exam     General -- no distress  Lungs -- clear  Heart -- S1, S2 heard, JVD - no  Abdomen - soft, non-tender  Extremities -- no edema  CNS - awake and alert    Labs, Radiology and Tests    Labs -     11/18 4/19  5/20  10/20   4/21  8/21 4/22        Potassium 4.9  3.8 3.9   4.0  4.2  4.4  3.9       BUN 18  18 24   19  27  19  12       Creatinine 1.45  1.28  1.86  1.36  1.43  1.28  1.14       eGFR 34  39  28  36  34  38  44                             UPCR 30 100       100 100          UMCR    19 9     2  2                                   Renal Ultrasound Scan -- 1/2019  Rt kidney 10 cm/ Left kidney 10 cm  Normal study      Echocardiogram 3/2015 shows ejection fraction 20-25% and mild mitral regurgitation     Assessment    1. Renal - patient seems to be having chronic kidney disease stage III most likely secondary to senile nephrosclerosis long-standing hypertension/diabetes  -Patient's creatinine has been stable . infact better  2. Essential hypertension running well. Continue current medications. 3. Electrolytes-appear to be within normal limits  4. Microalbuminuria-minimal on lisinopril  5. History of systolic congestive heart failure with ejection fraction 25% in 3/2015 . 6. Hx of Diabetes mellitus  7. FU in 12 months      Tests and orders placed this Encounter     No orders of the defined types were placed in this encounter. Jorden Renee M.D  [facility].

## 2023-04-05 ENCOUNTER — OFFICE VISIT (OUTPATIENT)
Dept: NEPHROLOGY | Age: 88
End: 2023-04-05
Payer: MEDICARE

## 2023-04-05 VITALS — HEART RATE: 69 BPM | SYSTOLIC BLOOD PRESSURE: 134 MMHG | DIASTOLIC BLOOD PRESSURE: 80 MMHG | OXYGEN SATURATION: 96 %

## 2023-04-05 DIAGNOSIS — N18.32 STAGE 3B CHRONIC KIDNEY DISEASE (HCC): Primary | ICD-10-CM

## 2023-04-05 DIAGNOSIS — I10 ESSENTIAL HYPERTENSION: ICD-10-CM

## 2023-04-05 PROCEDURE — 99213 OFFICE O/P EST LOW 20 MIN: CPT | Performed by: INTERNAL MEDICINE

## 2023-04-05 PROCEDURE — G8420 CALC BMI NORM PARAMETERS: HCPCS | Performed by: INTERNAL MEDICINE

## 2023-04-05 PROCEDURE — G8427 DOCREV CUR MEDS BY ELIG CLIN: HCPCS | Performed by: INTERNAL MEDICINE

## 2023-04-05 PROCEDURE — 1123F ACP DISCUSS/DSCN MKR DOCD: CPT | Performed by: INTERNAL MEDICINE

## 2023-04-05 PROCEDURE — 1090F PRES/ABSN URINE INCON ASSESS: CPT | Performed by: INTERNAL MEDICINE

## 2023-04-05 PROCEDURE — 1036F TOBACCO NON-USER: CPT | Performed by: INTERNAL MEDICINE

## 2023-04-05 NOTE — PROGRESS NOTES
[facility]        Outpatient Follow-Up note         4/5/2023 10:30 AM    Patient Name:   Stephanie De La Rosa  YOB: 1935  Primary Care Physician:  Mavis Chen     Chief Complaint / Reason for follow-up : Follow Up of CKD     Interval History :  Patient seen and examined by me. Feels well. No chest pain or SOB. No acute distress. No hospitalizations or med changes.  Had eye surgery done     Past History :  Past Medical History:   Diagnosis Date    Diabetes ([facility] [address]     Heart attack ([facility] [address] 03/2015    Hypertension     Vertigo      Past Surgical History:   Procedure Laterality Date    BREAST SURGERY Right 01/1955    CHOLECYSTECTOMY      1980    JOINT REPLACEMENT  03/2015        Medications :     Outpatient Medications Marked as Taking for the 4/5/23 encounter (Office Visit) with Taz Dolan MD   Medication Sig Dispense Refill    metFORMIN (GLUCOPHAGE) 1000 MG tablet Take 1 tablet by mouth 2 times daily      clopidogrel (PLAVIX) 75 MG tablet Take 1 tablet by mouth daily      lisinopril (PRINIVIL;ZESTRIL) 2.5 MG tablet Take 1 tablet by mouth 2 times daily      metoprolol tartrate (LOPRESSOR) 25 MG tablet Take 0.5 tablets by mouth 2 times daily      atorvastatin (LIPITOR) 40 MG tablet Take 1 tablet by mouth daily      aspirin 81 MG tablet Take 1 tablet by mouth daily      meclizine (ANTIVERT) 12.5 MG tablet Take by mouth as needed      magnesium oxide (MAG-OX) 400 MG tablet Take 1 tablet by mouth 2 times daily         Vitals     /80 (Site: Left Upper Arm, Position: Sitting, Cuff Size: Medium Adult)   Pulse 69   SpO2 96%  Wt Readings from Last 3 Encounters:   04/11/22 140 lb (63.5 kg)   10/11/21 137 lb (62.1 kg)   04/12/21 144 lb (65.3 kg)        Physical Exam     General -- no distress  Lungs -- clear  Heart -- S1, S2 heard, JVD - no  Abdomen - soft, non-tender  Extremities -- no edema  CNS - awake and alert    Labs, Radiology and Tests    Labs -     11/18 4/19

## 2024-05-20 DIAGNOSIS — I10 ESSENTIAL HYPERTENSION: ICD-10-CM

## 2024-05-20 DIAGNOSIS — N18.32 STAGE 3B CHRONIC KIDNEY DISEASE (HCC): Primary | ICD-10-CM

## 2024-05-22 ENCOUNTER — OFFICE VISIT (OUTPATIENT)
Dept: NEPHROLOGY | Age: 89
End: 2024-05-22
Payer: MEDICARE

## 2024-05-22 VITALS
OXYGEN SATURATION: 100 % | HEART RATE: 75 BPM | DIASTOLIC BLOOD PRESSURE: 76 MMHG | BODY MASS INDEX: 23.74 KG/M2 | WEIGHT: 134 LBS | SYSTOLIC BLOOD PRESSURE: 124 MMHG

## 2024-05-22 DIAGNOSIS — I10 ESSENTIAL HYPERTENSION: Primary | ICD-10-CM

## 2024-05-22 DIAGNOSIS — N18.31 STAGE 3A CHRONIC KIDNEY DISEASE (HCC): ICD-10-CM

## 2024-05-22 PROCEDURE — 99213 OFFICE O/P EST LOW 20 MIN: CPT | Performed by: INTERNAL MEDICINE

## 2024-05-22 PROCEDURE — G8420 CALC BMI NORM PARAMETERS: HCPCS | Performed by: INTERNAL MEDICINE

## 2024-05-22 PROCEDURE — 1036F TOBACCO NON-USER: CPT | Performed by: INTERNAL MEDICINE

## 2024-05-22 PROCEDURE — G8427 DOCREV CUR MEDS BY ELIG CLIN: HCPCS | Performed by: INTERNAL MEDICINE

## 2024-05-22 PROCEDURE — 1090F PRES/ABSN URINE INCON ASSESS: CPT | Performed by: INTERNAL MEDICINE

## 2024-05-22 PROCEDURE — 1123F ACP DISCUSS/DSCN MKR DOCD: CPT | Performed by: INTERNAL MEDICINE

## 2024-05-22 NOTE — PROGRESS NOTES
[facility]        Outpatient Follow-Up note         5/22/2024 9:24 AM    Patient Name:   Elida Rose  YOB: 1935  Primary Care Physician:  Behzad Mosley MD     Chief Complaint / Reason for follow-up : Follow Up of CKD     Interval History :  Patient seen and examined by me. Feels well.  No chest pain or SOB. No acute distress.  No hospitalizations or med changes.   No urinary complaints .     Past History :  Past Medical History:   Diagnosis Date    Diabetes ([facility])     Heart attack ([facility]) 03/2015    Hypertension     Vertigo      Past Surgical History:   Procedure Laterality Date    BREAST SURGERY Right 01/1955    CHOLECYSTECTOMY      1980    JOINT REPLACEMENT  03/2015        Medications :     Outpatient Medications Marked as Taking for the 5/22/24 encounter (Office Visit) with Osmar Harris MD   Medication Sig Dispense Refill    metFORMIN (GLUCOPHAGE) 1000 MG tablet Take 1 tablet by mouth 2 times daily      clopidogrel (PLAVIX) 75 MG tablet Take 1 tablet by mouth daily      lisinopril (PRINIVIL;ZESTRIL) 2.5 MG tablet Take 1 tablet by mouth 2 times daily      metoprolol tartrate (LOPRESSOR) 25 MG tablet Take 0.5 tablets by mouth 2 times daily      atorvastatin (LIPITOR) 40 MG tablet Take 1 tablet by mouth daily      aspirin 81 MG tablet Take 1 tablet by mouth daily      meclizine (ANTIVERT) 12.5 MG tablet Take by mouth as needed      magnesium oxide (MAG-OX) 400 MG tablet Take 1 tablet by mouth 2 times daily         Vitals     /76 (Site: Left Upper Arm, Position: Sitting, Cuff Size: Medium Adult)   Pulse 75   Wt 60.8 kg (134 lb)   SpO2 100%   BMI 23.74 kg/m²  Wt Readings from Last 3 Encounters:   05/22/24 60.8 kg (134 lb)   04/11/22 63.5 kg (140 lb)   10/11/21 62.1 kg (137 lb)        Physical Exam     General -- no distress  Lungs -- clear  Heart -- S1, S2 heard, JVD - no  Abdomen - soft, non-tender  Extremities -- no edema  CNS - awake and

## 2025-06-03 DIAGNOSIS — I10 ESSENTIAL HYPERTENSION: Primary | ICD-10-CM

## 2025-06-03 DIAGNOSIS — N18.31 STAGE 3A CHRONIC KIDNEY DISEASE (HCC): ICD-10-CM

## 2025-06-03 DIAGNOSIS — N18.30 STAGE 3 CHRONIC KIDNEY DISEASE, UNSPECIFIED WHETHER STAGE 3A OR 3B CKD (HCC): ICD-10-CM

## 2025-06-03 DIAGNOSIS — R80.9 MICROALBUMINURIA: ICD-10-CM

## 2025-08-13 LAB
ALBUMIN: 4 G/DL
BUN / CREAT RATIO: 13
BUN BLDV-MCNC: 17 MG/DL
CALCIUM SERPL-MCNC: 9.1 MG/DL
CHLORIDE BLD-SCNC: 104 MMOL/L
CO2: 28 MMOL/L
CREAT SERPL-MCNC: 1.26 MG/DL
GFR, ESTIMATED: 41
GLUCOSE: 121
PHOSPHORUS: 3.8 MG/DL
PHOSPHORUS: 3.8 MG/DL
POTASSIUM SERPL-SCNC: 3.9 MMOL/L
SODIUM BLD-SCNC: 141 MMOL/L

## 2025-08-15 DIAGNOSIS — R80.9 MICROALBUMINURIA: ICD-10-CM

## 2025-08-15 DIAGNOSIS — N18.31 STAGE 3A CHRONIC KIDNEY DISEASE (HCC): ICD-10-CM

## 2025-08-15 DIAGNOSIS — N18.30 STAGE 3 CHRONIC KIDNEY DISEASE, UNSPECIFIED WHETHER STAGE 3A OR 3B CKD (HCC): ICD-10-CM

## 2025-08-15 DIAGNOSIS — I10 ESSENTIAL HYPERTENSION: ICD-10-CM

## 2025-08-15 LAB
CREATININE URINE: 85 MG/DL
MICROALBUMIN/CREAT 24H UR: 0.2 MG/DL
MICROALBUMIN/CREAT UR-RTO: 2 MG/G

## 2025-08-20 ENCOUNTER — TELEPHONE (OUTPATIENT)
Dept: NEPHROLOGY | Age: 89
End: 2025-08-20

## 2025-08-20 ENCOUNTER — OFFICE VISIT (OUTPATIENT)
Dept: NEPHROLOGY | Age: 89
End: 2025-08-20
Payer: MEDICARE

## 2025-08-20 VITALS — HEART RATE: 66 BPM | OXYGEN SATURATION: 96 % | SYSTOLIC BLOOD PRESSURE: 140 MMHG | DIASTOLIC BLOOD PRESSURE: 82 MMHG

## 2025-08-20 DIAGNOSIS — N18.4 STAGE 4 CHRONIC KIDNEY DISEASE (HCC): ICD-10-CM

## 2025-08-20 DIAGNOSIS — I10 ESSENTIAL HYPERTENSION: Primary | ICD-10-CM

## 2025-08-20 PROCEDURE — G8421 BMI NOT CALCULATED: HCPCS | Performed by: INTERNAL MEDICINE

## 2025-08-20 PROCEDURE — 99214 OFFICE O/P EST MOD 30 MIN: CPT | Performed by: INTERNAL MEDICINE

## 2025-08-20 PROCEDURE — 1123F ACP DISCUSS/DSCN MKR DOCD: CPT | Performed by: INTERNAL MEDICINE

## 2025-08-20 PROCEDURE — 1090F PRES/ABSN URINE INCON ASSESS: CPT | Performed by: INTERNAL MEDICINE

## 2025-08-20 PROCEDURE — 4004F PT TOBACCO SCREEN RCVD TLK: CPT | Performed by: INTERNAL MEDICINE

## 2025-08-20 PROCEDURE — G2211 COMPLEX E/M VISIT ADD ON: HCPCS | Performed by: INTERNAL MEDICINE

## 2025-08-20 PROCEDURE — 1159F MED LIST DOCD IN RCRD: CPT | Performed by: INTERNAL MEDICINE

## 2025-08-20 PROCEDURE — G8427 DOCREV CUR MEDS BY ELIG CLIN: HCPCS | Performed by: INTERNAL MEDICINE
